# Patient Record
Sex: FEMALE | Race: WHITE | Employment: FULL TIME | URBAN - METROPOLITAN AREA
[De-identification: names, ages, dates, MRNs, and addresses within clinical notes are randomized per-mention and may not be internally consistent; named-entity substitution may affect disease eponyms.]

---

## 2024-04-05 ENCOUNTER — OFFICE VISIT (OUTPATIENT)
Dept: FAMILY MEDICINE CLINIC | Age: 34
End: 2024-04-05

## 2024-04-05 VITALS
WEIGHT: 135.4 LBS | RESPIRATION RATE: 16 BRPM | OXYGEN SATURATION: 99 % | DIASTOLIC BLOOD PRESSURE: 70 MMHG | HEART RATE: 100 BPM | SYSTOLIC BLOOD PRESSURE: 98 MMHG

## 2024-04-05 DIAGNOSIS — Z12.4 PAP SMEAR FOR CERVICAL CANCER SCREENING: ICD-10-CM

## 2024-04-05 DIAGNOSIS — R45.4 DIFFICULTY CONTROLLING ANGER: Primary | ICD-10-CM

## 2024-04-05 DIAGNOSIS — G47.31 CENTRAL SLEEP APNEA: ICD-10-CM

## 2024-04-05 PROCEDURE — 99204 OFFICE O/P NEW MOD 45 MIN: CPT

## 2024-04-05 RX ORDER — BUPROPION HYDROCHLORIDE 150 MG/1
150 TABLET ORAL EVERY MORNING
COMMUNITY
End: 2024-04-05 | Stop reason: SDUPTHER

## 2024-04-05 RX ORDER — BUPROPION HYDROCHLORIDE 150 MG/1
150 TABLET ORAL EVERY MORNING
Qty: 90 TABLET | Refills: 0 | Status: SHIPPED | OUTPATIENT
Start: 2024-04-05 | End: 2024-07-04

## 2024-04-05 SDOH — ECONOMIC STABILITY: FOOD INSECURITY: WITHIN THE PAST 12 MONTHS, YOU WORRIED THAT YOUR FOOD WOULD RUN OUT BEFORE YOU GOT MONEY TO BUY MORE.: NEVER TRUE

## 2024-04-05 SDOH — ECONOMIC STABILITY: FOOD INSECURITY: WITHIN THE PAST 12 MONTHS, THE FOOD YOU BOUGHT JUST DIDN'T LAST AND YOU DIDN'T HAVE MONEY TO GET MORE.: NEVER TRUE

## 2024-04-05 SDOH — ECONOMIC STABILITY: HOUSING INSECURITY
IN THE LAST 12 MONTHS, WAS THERE A TIME WHEN YOU DID NOT HAVE A STEADY PLACE TO SLEEP OR SLEPT IN A SHELTER (INCLUDING NOW)?: NO

## 2024-04-05 SDOH — ECONOMIC STABILITY: INCOME INSECURITY: HOW HARD IS IT FOR YOU TO PAY FOR THE VERY BASICS LIKE FOOD, HOUSING, MEDICAL CARE, AND HEATING?: NOT HARD AT ALL

## 2024-04-05 ASSESSMENT — PATIENT HEALTH QUESTIONNAIRE - PHQ9
10. IF YOU CHECKED OFF ANY PROBLEMS, HOW DIFFICULT HAVE THESE PROBLEMS MADE IT FOR YOU TO DO YOUR WORK, TAKE CARE OF THINGS AT HOME, OR GET ALONG WITH OTHER PEOPLE: NOT DIFFICULT AT ALL
1. LITTLE INTEREST OR PLEASURE IN DOING THINGS: NOT AT ALL
SUM OF ALL RESPONSES TO PHQ QUESTIONS 1-9: 0
7. TROUBLE CONCENTRATING ON THINGS, SUCH AS READING THE NEWSPAPER OR WATCHING TELEVISION: NOT AT ALL
9. THOUGHTS THAT YOU WOULD BE BETTER OFF DEAD, OR OF HURTING YOURSELF: NOT AT ALL
3. TROUBLE FALLING OR STAYING ASLEEP: NOT AT ALL
SUM OF ALL RESPONSES TO PHQ QUESTIONS 1-9: 0
5. POOR APPETITE OR OVEREATING: NOT AT ALL
SUM OF ALL RESPONSES TO PHQ QUESTIONS 1-9: 0
6. FEELING BAD ABOUT YOURSELF - OR THAT YOU ARE A FAILURE OR HAVE LET YOURSELF OR YOUR FAMILY DOWN: NOT AT ALL
4. FEELING TIRED OR HAVING LITTLE ENERGY: NOT AT ALL
8. MOVING OR SPEAKING SO SLOWLY THAT OTHER PEOPLE COULD HAVE NOTICED. OR THE OPPOSITE, BEING SO FIGETY OR RESTLESS THAT YOU HAVE BEEN MOVING AROUND A LOT MORE THAN USUAL: NOT AT ALL
SUM OF ALL RESPONSES TO PHQ QUESTIONS 1-9: 0
2. FEELING DOWN, DEPRESSED OR HOPELESS: NOT AT ALL
SUM OF ALL RESPONSES TO PHQ9 QUESTIONS 1 & 2: 0

## 2024-04-05 ASSESSMENT — ENCOUNTER SYMPTOMS
COUGH: 0
COLOR CHANGE: 0
SORE THROAT: 0
CONSTIPATION: 0
ABDOMINAL PAIN: 0
WHEEZING: 0
DIARRHEA: 0
BLOOD IN STOOL: 0

## 2024-04-05 NOTE — PROGRESS NOTES
Caridad Villafana (:  1990) is a 34 y.o. female,New patient, here for evaluation of the following chief complaint(s):  Establish Care (Pt is here to establish care) and Medication Refill         ASSESSMENT/PLAN:  1. Difficulty controlling anger  -     buPROPion (WELLBUTRIN XL) 150 MG extended release tablet; Take 1 tablet by mouth every morning, Disp-90 tablet, R-0Normal  -     sertraline (ZOLOFT) 50 MG tablet; Take 1 tablet by mouth daily, Disp-90 tablet, R-0Normal  - Patient was placed on Zoloft and Wellbutrin when she was seeing a provider in [place].  She states that she was started on Wellbutrin for her anger which helped but then gave her anxiety so they put her on Zoloft.  Educated patient that oftentimes when first initiating Wellbutrin it can cause increased anxiety.  She denies any anxiety symptoms.  Does endorse her main symptom was irritability and anger which the combination of Wellbutrin and Zoloft has managed and she is doing well with her current regimen and would like to continue this.  It has been a lengthy amount of time educating patient on medications and medication side effects.  Spent lengthy time educating patient on worsening mood and when to seek further care.  Plan at this time is to continue current regimen we will check kidney and liver function to ensure no adverse cause to medications.  Patient is agreeable understands plan at this time      2024     1:10 PM   PHQ Scores   PHQ2 Score 0   PHQ9 Score 0       2. Central sleep apnea  -     CBC with Auto Differential; Future  -     [facility]  - Patient endorses that she was diagnosed with obstructive sleep apnea and is using a BiPAP.  Further questioning of the patient she states that she is always had this issue for quite some time.  Was diagnosed in [place].  Given patient's reports concern for central sleep apnea would recommend that she follow-up with pulmonology here.  Referral to pulmonology has been made.